# Patient Record
Sex: FEMALE | Race: WHITE | Employment: FULL TIME | URBAN - METROPOLITAN AREA
[De-identification: names, ages, dates, MRNs, and addresses within clinical notes are randomized per-mention and may not be internally consistent; named-entity substitution may affect disease eponyms.]

---

## 2021-06-03 VITALS — WEIGHT: 270 LBS | BODY MASS INDEX: 44.98 KG/M2 | HEIGHT: 65 IN

## 2021-06-03 RX ORDER — CITALOPRAM 20 MG/1
20 TABLET, FILM COATED ORAL EVERY EVENING
COMMUNITY

## 2021-06-03 RX ORDER — METFORMIN HYDROCHLORIDE 500 MG/1
500 TABLET ORAL EVERY EVENING
COMMUNITY

## 2021-06-03 NOTE — PERIOP NOTES
Patient verified name and . Order for consent not found in EHR ; patient verifies procedure. Type 2 surgery, Phone assessment complete. Orders not received. Labs per surgeon: none  Labs per anesthesia protocol: hgb and ekg - pt to come to [facility] between 0900 - 1000. Patient fully vaccinated for Covid -19 and card scanned to chart. Patient answered medical/surgical history questions at their best of ability. All prior to admission medications documented in [facility]. Patient instructed to take the following medications the day of surgery according to anesthesia guidelines with a small sip of water: none On the day before surgery please take Acetaminophen 1000mg in the morning and then again before bed. You may substitute for Tylenol 650 mg. Hold all vitamins 7 days prior to surgery and NSAIDS 5 days prior to surgery. Prescription meds to hold:none        Patient instructed on the following:    > Arrive at [address], time of arrival to be called the day before by 1700  > NPO after midnight including gum, mints, and ice chips  > Responsible adult must drive patient to the hospital, stay during surgery, and patient will need supervision 24 hours after anesthesia  > Use antibacterial soap in shower the night before surgery and on the morning of surgery  > All piercings must be removed prior to arrival.    > Leave all valuables (money and jewelry) at home but bring insurance card and ID on DOS.   > Do not wear make-up, nail polish, lotions, cologne, perfumes, powders, or oil on skin.

## 2021-06-04 ENCOUNTER — HOSPITAL ENCOUNTER (OUTPATIENT)
Dept: SURGERY | Age: 26
Discharge: HOME | End: 2021-06-04
Attending: OBSTETRICS & GYNECOLOGY
Payer: COMMERCIAL

## 2021-06-04 LAB
ATRIAL RATE: 103 BPM
CALCULATED P AXIS, ECG09: 28 DEGREES
CALCULATED R AXIS, ECG10: 28 DEGREES
CALCULATED T AXIS, ECG11: 4 DEGREES
DIAGNOSIS, 93000: NORMAL
HGB BLD-MCNC: 13.9 G/DL (ref 11.7–15.4)
P-R INTERVAL, ECG05: 152 MS
Q-T INTERVAL, ECG07: 348 MS
QRS DURATION, ECG06: 72 MS
QTC CALCULATION (BEZET), ECG08: 455 MS
VENTRICULAR RATE, ECG03: 103 BPM

## 2021-06-04 PROCEDURE — 93005 ELECTROCARDIOGRAM TRACING: CPT | Performed by: ANESTHESIOLOGY

## 2021-06-04 PROCEDURE — 36415 COLL VENOUS BLD VENIPUNCTURE: CPT

## 2021-06-04 PROCEDURE — 85018 HEMOGLOBIN: CPT

## 2021-06-04 NOTE — PERIOP NOTES
EKG done today- result needs anesthesia review. Pt reports has never had EKG done in past.    Hgb result pending. PAT charge RN to follow-up with anesthesia response.

## 2021-06-04 NOTE — PERIOP NOTES
Hgb result within anesthesia guidelines.     Recent Results (from the past 12 hour(s))   HEMOGLOBIN    Collection Time: 06/04/21 10:04 AM   Result Value Ref Range    HGB 13.9 11.7 - 15.4 g/dL

## 2021-06-04 NOTE — PERIOP NOTES
Dr Henry Lopez (anesthesia) reviewed pt's EKG with hx and stated \"okay\" to proceed with surgery- no further action required.

## 2021-06-08 ENCOUNTER — HOSPITAL ENCOUNTER (OUTPATIENT)
Dept: SURGERY | Age: 26
Discharge: HOME | End: 2021-06-08

## 2021-06-08 PROBLEM — N93.9 ABNORMAL UTERINE BLEEDING (AUB): Status: ACTIVE | Noted: 2021-06-08

## 2021-06-08 PROBLEM — D25.2 SUBSEROUS LEIOMYOMA OF UTERUS: Status: ACTIVE | Noted: 2021-06-08

## 2021-06-08 PROBLEM — R10.2 PELVIC PAIN: Status: ACTIVE | Noted: 2021-06-08

## 2021-06-08 PROBLEM — N94.6 DYSMENORRHEA: Status: ACTIVE | Noted: 2021-06-08

## 2021-06-08 NOTE — H&P
[facility]  Patient: David Rangel (Female)  [address] [facility]  [address] [place], [address] [facility] [address]  [phone]  [email or web address] :1995 (26)   Previous First Name:   Previous Last Name:   Don Choi   Ethnicity:Patient Declined  Sexual Orientation:   Gender Identity:   Encounter ID: [id number]   Primary Ins: [address] [employer] ([address] [place] [address])   Location: [address] ([facility])  [address] [place] [address]  [id number] [phone] Ext:[phone] Provider: Jonatan Quinones MD Referring:       Subjective  Chief Complaint:     Surgery (gyn) : Decision for Surgery/ Preop  Question  Comments  Planned Procedure: Hysteroscopy, Diagnostic Laparoscopy, Myomectomy, possible excision or laser ablation of endometriosis, Chromotubation. and any other indicated procedure. Medication History:  Date Medication Sig # Refill Status  2021 Farxiga 10 mg tablet   0 Active  2021 metformin 500 mg tablet   0 Active  2021 citalopram 20 mg tablet 1.5 tablet by mouth daily  0 Active  Allergies: Allergen Severity Adverse Reactions Onset Date Type Source Status  Azelastine Mild   Allergy Patient Active  Medical History:  Positive History  Condition Physician Hospitalization [address]   Asthma  Obstetrics History:  Pregnancy Hx  Total Preg. Full Term Premature Ab. Induced Ab. Spontaneous Ectopics Multiple Births Living  0         Surgical History:  Positive History  Condition Physician Hospitalization Hosp Date Hospital   knee arthroscopy- 10 yrs ago  wisdom teeth  frenectomy/periodontal  Family History:  Type Sex Status Age Name Negative History Positive History Onset Date Positive Comment Negative Comment  Mother F Alive         Maternal Grandmother F Alive    Breast Ca    Review of Systems:     Constitutional  Denied: Chills. Decline in health. Fatigue. Fever. Weakness. Weight Gain.  Weight Loss.   Head  Denied: Dizziness. Fainting. Head injury. Headaches. Pain. Sweats. Respiratory  Denied: Bronchitis. Cough. Pain. Pleurisy. Short of Breath. Wheezing. Cardiovascular  Denied: Chest Pain. Heart Murmur. History of Heart Attack. Palpitations. Short of Breath - Exertion. Short of Breath - Lying Flat. Short of Breath - Sleeping. Swelling of legs. Ulcers on Legs. Gastrointestinal  Denied: Abdominal pain. Black tarry stools. Change in stool color. Decreased Appetite. Nausea. Diarrhea. Rectal Bleeding. Vomiting. Heartburn. Constipation. Musculoskeletal  Denied: Arthritis. Muscle cramps. Restricted motion. Weakness. Joint Pain. Back Problems. Psychiatric  Denied: Memory loss. Depression. Excessive stress. Mood changes. Nervousness. Breasts  Denied: Discharge. Lumps. Tenderness. Pain. Endocrine  Denied: Cold Intolerance. Weakness. Heat Intolerance. Sweats. Weight Gain. Fatigue. Weight Loss. Thyroid Trouble. Genitourinary  Urinary  Denied: Burning. Urine odor. Frequency. Pain on Urination. Urgency. Incontinence. Retention. Urine discoloration. Blood in urine. Female Genitalia  Denied: Sexual Problems. Discharge. Itching. Menstrual Pain. Pain on intercourse. Objective  Vital Signs:     Blood Pressure  Artery Body Side Position Pressure Flag  Brachial Left Sitting 136/70 Normal  Height, Weight, BMI and Measurements  Height Weight BMI Flag Head Neck Waist  5' 5\" 273 (lb)  45.4 Obese          Physical Exam:       Constitutional: The patient is awake, alert, well developed, well nourished and well groomed. Head: The skull is normocephalic, atraumatic and without masses. The patient's facial expression and facial contours are normal; the parotid glands are not enlarged. There is no facial droop. Eyes: The eyelids are without lesions. The sclera is white and the conjunctiva pink. Pupils are equally round. ENT: External inspection of ears and nose is without scars, lesions or masses. Dentition is good. The lips are normal color; there are no ulcers, masses or lesions. Neck: The neck is supple and the trachea is midline. The thyroid is not enlarged and there are no palpable nodules. Respiratory: The patient is relaxed and breathes without effort. The patient is not cyanotic and does not use the accessory muscles of respiration. The lungs are clear to percussion. There are no crackles, wheezes, rhonchi, stridor or pleural rubs. Cardiovascular: The rate is normal, the rhythm is regular, S1 and S2 are normal, there are no murmurs, no gallops, and there are no rubs. The peripheral artery pulses are 2+ brisk. Lymphatics: There is no cervical, axillary, or inguinal adenopathy. Musculoskeletal: Upon inspection, the alignment of the major joints and spine is symmetrical. There are no ecchymosis, erythema, lacerations, subcutaneous nodules, or signs of muscle atrophy. Neurologic: Cranial Nerves 2-12 are grossly intact. The gait is normal.      Psychiatric: The patient is oriented to person, place, and time. Speech is fluent and words are clear. Thought processes are coherent, insight is good. The patient's fund of knowledge: awareness of current events and past history is appropriate for age. The patient's mood is neutral and the affect appropriate. Assessment  Diagnosis:          1) Pelvic pain, mostly LLQ, says over the ovaries,  reviewed prior information,  u/s and testing         ENLARGED UTERUS APPROX 8 WEEKS SIZE WITH LARGE POSTERIOR FUNDAL FIBROID SUBSEROSAL FIBROID MEASURES 6.2 X 5.3 X 5.6 CM ENDOMETRIUM IS HARD TO DELINEATE BORDERS - LIKELY MEASURES 10 MMS WITH SLIGHT COLOR SEEN IN ONE AREA. FIBROID DOES NOT DISTORT THE ENDOMETRIUM RIGHT OVARY IS VAGUELY SEEN - LIKELY WNL LEFT OVARY IS WNL VERY DIFFICULT SCAN BOTH TV AND ABDOMINALLY DUE TO BMI NO FREE FLUID SEEN.         Hysteroscopy, Diagnostic Laparoscopy, Myomectomy, possible excision or laser ablation of endometriosis, Chromotubation. and any other indicated procedure. wanted to examine but patient requested not to have one. Discussed the pain, the fibroids, possible causes,  the possible GI testing,  Open or Laparoscopic approach. With the bleeding issues and her obesity, would do a hysteroscopy at same time. told would try Laparoscopic since less risk for her. Risk for cancer, said by only one group to be 1/300, more likely data says 1/3,000. With her young age, feel less than 1/3,000. Plus would not spill as place in a bag to remove. Do aggressive irrigation. If was cancer, poor outcome either way. Risk of surgery greater due to her obesity, risk of infection, organ damage, new pain, chronic pain, bleeding, infection, anesthesia, life threatening events, pain remaining, and recovery. Fertility issues,  Will proceed with Hysteroscopy, Diagnostic Laparoscopy, Myomectomy, possible excision or laser ablation of endometriosis, Chromotubation. and any other indicated procedure. Description     Code Problem Comment    Leiomyoma Of Uterus, Unspecified D259    Other Obesity F822    Abnormal Uterine And Vaginal Bleeding, Unspecified N939    Pelvic And Perineal Pain R102    Secondary Dysmenorrhea N945      Plan    Procedure Coding: The risks, benefits, and alternatives of surgery were discussed with the patient, all questions answered, and consents signed. Risks of surgery are rare but sometimes unanticipated and include but are not limited to pain, bleeding, infection, reaction to anesthesia, blood clots, death. Risks also include damage to bowel, bladder, blood vessels, nerves, ureters, female reproductive tract and need for further surgery. Possible change in bladder, bowel, or sexual function. Patient understands all risks and would like to proceed with the surgery.      Description Code Units Modifiers Comments  Office O/p Est Hi [id number] 1 UN 57   Follow Up:  Type Recall Date Provider Name Notes  Follow-Up 3 Renetta Luke for the 2 week post op visit.

## 2021-06-11 ENCOUNTER — ANESTHESIA EVENT (OUTPATIENT)
Dept: SURGERY | Age: 26
End: 2021-06-11
Payer: COMMERCIAL

## 2021-06-14 ENCOUNTER — HOSPITAL ENCOUNTER (OUTPATIENT)
Age: 26
Setting detail: OUTPATIENT SURGERY
Discharge: HOME | End: 2021-06-14
Attending: OBSTETRICS & GYNECOLOGY | Admitting: OBSTETRICS & GYNECOLOGY
Payer: COMMERCIAL

## 2021-06-14 ENCOUNTER — ANESTHESIA (OUTPATIENT)
Dept: SURGERY | Age: 26
End: 2021-06-14
Payer: COMMERCIAL

## 2021-06-14 VITALS
BODY MASS INDEX: 45.1 KG/M2 | OXYGEN SATURATION: 93 % | TEMPERATURE: 99.1 F | DIASTOLIC BLOOD PRESSURE: 56 MMHG | RESPIRATION RATE: 18 BRPM | WEIGHT: 271 LBS | SYSTOLIC BLOOD PRESSURE: 145 MMHG | HEART RATE: 107 BPM

## 2021-06-14 DIAGNOSIS — G89.18 POST-OP PAIN: Primary | ICD-10-CM

## 2021-06-14 LAB
GLUCOSE BLD STRIP.AUTO-MCNC: 162 MG/DL (ref 65–100)
GLUCOSE BLD STRIP.AUTO-MCNC: 231 MG/DL (ref 65–100)
HCG UR QL: NEGATIVE
SERVICE CMNT-IMP: ABNORMAL
SERVICE CMNT-IMP: ABNORMAL

## 2021-06-14 PROCEDURE — 74011250637 HC RX REV CODE- 250/637: Performed by: ANESTHESIOLOGY

## 2021-06-14 PROCEDURE — 77030040830 HC CATH URETH FOL MDII -A: Performed by: OBSTETRICS & GYNECOLOGY

## 2021-06-14 PROCEDURE — 77030007955 HC PCH ENDOSC SPEC J&J -B: Performed by: OBSTETRICS & GYNECOLOGY

## 2021-06-14 PROCEDURE — 77030012317 HC CATH URET INT COVD -A: Performed by: OBSTETRICS & GYNECOLOGY

## 2021-06-14 PROCEDURE — 77030011502 HC MANIP UTER ZUM ZINN -B: Performed by: OBSTETRICS & GYNECOLOGY

## 2021-06-14 PROCEDURE — 2709999900 HC NON-CHARGEABLE SUPPLY: Performed by: OBSTETRICS & GYNECOLOGY

## 2021-06-14 PROCEDURE — 77030031139 HC SUT VCRL2 J&J -A: Performed by: OBSTETRICS & GYNECOLOGY

## 2021-06-14 PROCEDURE — 74011000250 HC RX REV CODE- 250

## 2021-06-14 PROCEDURE — 77030013626: Performed by: OBSTETRICS & GYNECOLOGY

## 2021-06-14 PROCEDURE — 77030028750 HC FCPS ENDOSC BPLR HALO OCOA -E: Performed by: OBSTETRICS & GYNECOLOGY

## 2021-06-14 PROCEDURE — 76010000135 HC OR TIME 4 TO 4.5 HR: Performed by: OBSTETRICS & GYNECOLOGY

## 2021-06-14 PROCEDURE — 77030003578 HC NDL INSUF VERES AMR -B: Performed by: OBSTETRICS & GYNECOLOGY

## 2021-06-14 PROCEDURE — 77030008771 HC TU NG SALEM SUMP -A: Performed by: NURSE ANESTHETIST, CERTIFIED REGISTERED

## 2021-06-14 PROCEDURE — 74011000250 HC RX REV CODE- 250: Performed by: OBSTETRICS & GYNECOLOGY

## 2021-06-14 PROCEDURE — 81025 URINE PREGNANCY TEST: CPT

## 2021-06-14 PROCEDURE — 77030008522 HC TBNG INSUF LAPRO STRY -B: Performed by: OBSTETRICS & GYNECOLOGY

## 2021-06-14 PROCEDURE — 74011250636 HC RX REV CODE- 250/636

## 2021-06-14 PROCEDURE — 77030022703 HC LIGASURE  BLNT LAPSCP SEAL COVD -E: Performed by: OBSTETRICS & GYNECOLOGY

## 2021-06-14 PROCEDURE — 77030003666 HC NDL SPINAL BD -A: Performed by: OBSTETRICS & GYNECOLOGY

## 2021-06-14 PROCEDURE — 74011000636 HC RX REV CODE- 636: Performed by: OBSTETRICS & GYNECOLOGY

## 2021-06-14 PROCEDURE — C1765 ADHESION BARRIER: HCPCS | Performed by: OBSTETRICS & GYNECOLOGY

## 2021-06-14 PROCEDURE — 77030034849: Performed by: OBSTETRICS & GYNECOLOGY

## 2021-06-14 PROCEDURE — 77030008606 HC TRCR ENDOSC KII AMR -B: Performed by: OBSTETRICS & GYNECOLOGY

## 2021-06-14 PROCEDURE — 77030040361 HC SLV COMPR DVT MDII -B: Performed by: OBSTETRICS & GYNECOLOGY

## 2021-06-14 PROCEDURE — 77030012770 HC TRCR OPT FX AMR -B: Performed by: OBSTETRICS & GYNECOLOGY

## 2021-06-14 PROCEDURE — 76210000020 HC REC RM PH II FIRST 0.5 HR: Performed by: OBSTETRICS & GYNECOLOGY

## 2021-06-14 PROCEDURE — 77030000038 HC TIP SCIS LAPSCP SURI -B: Performed by: OBSTETRICS & GYNECOLOGY

## 2021-06-14 PROCEDURE — 76210000016 HC OR PH I REC 1 TO 1.5 HR: Performed by: OBSTETRICS & GYNECOLOGY

## 2021-06-14 PROCEDURE — 77030040922 HC BLNKT HYPOTHRM STRY -A: Performed by: NURSE ANESTHETIST, CERTIFIED REGISTERED

## 2021-06-14 PROCEDURE — 74011250636 HC RX REV CODE- 250/636: Performed by: OBSTETRICS & GYNECOLOGY

## 2021-06-14 PROCEDURE — 77030008756 HC TU IRR SUC STRY -B: Performed by: OBSTETRICS & GYNECOLOGY

## 2021-06-14 PROCEDURE — 77030037088 HC TUBE ENDOTRACH ORAL NSL COVD-A: Performed by: NURSE ANESTHETIST, CERTIFIED REGISTERED

## 2021-06-14 PROCEDURE — 82962 GLUCOSE BLOOD TEST: CPT

## 2021-06-14 PROCEDURE — 77030039425 HC BLD LARYNG TRULITE DISP TELE -A: Performed by: NURSE ANESTHETIST, CERTIFIED REGISTERED

## 2021-06-14 PROCEDURE — 77030002933 HC SUT MCRYL J&J -A: Performed by: OBSTETRICS & GYNECOLOGY

## 2021-06-14 PROCEDURE — 77030019908 HC STETH ESOPH SIMS -A: Performed by: NURSE ANESTHETIST, CERTIFIED REGISTERED

## 2021-06-14 PROCEDURE — 88305 TISSUE EXAM BY PATHOLOGIST: CPT

## 2021-06-14 PROCEDURE — 77030020829: Performed by: OBSTETRICS & GYNECOLOGY

## 2021-06-14 PROCEDURE — 76060000039 HC ANESTHESIA 4 TO 4.5 HR: Performed by: OBSTETRICS & GYNECOLOGY

## 2021-06-14 PROCEDURE — 74011250636 HC RX REV CODE- 250/636: Performed by: ANESTHESIOLOGY

## 2021-06-14 PROCEDURE — 77030017963 HC PRB COAG1 CNMD -C: Performed by: OBSTETRICS & GYNECOLOGY

## 2021-06-14 PROCEDURE — 77030002966 HC SUT PDS J&J -A: Performed by: OBSTETRICS & GYNECOLOGY

## 2021-06-14 RX ORDER — ACETAMINOPHEN 500 MG
1000 TABLET ORAL ONCE
Status: COMPLETED | OUTPATIENT
Start: 2021-06-14 | End: 2021-06-14

## 2021-06-14 RX ORDER — OXYCODONE AND ACETAMINOPHEN 10; 325 MG/1; MG/1
1 TABLET ORAL AS NEEDED
Status: DISCONTINUED | OUTPATIENT
Start: 2021-06-14 | End: 2021-06-15 | Stop reason: HOSPADM

## 2021-06-14 RX ORDER — SODIUM CHLORIDE 0.9 % (FLUSH) 0.9 %
5-40 SYRINGE (ML) INJECTION AS NEEDED
Status: DISCONTINUED | OUTPATIENT
Start: 2021-06-14 | End: 2021-06-15 | Stop reason: HOSPADM

## 2021-06-14 RX ORDER — LIDOCAINE HYDROCHLORIDE 20 MG/ML
INJECTION, SOLUTION EPIDURAL; INFILTRATION; INTRACAUDAL; PERINEURAL AS NEEDED
Status: DISCONTINUED | OUTPATIENT
Start: 2021-06-14 | End: 2021-06-14 | Stop reason: HOSPADM

## 2021-06-14 RX ORDER — OXYCODONE HYDROCHLORIDE 5 MG/1
5 TABLET ORAL
Qty: 20 TABLET | Refills: 0 | Status: SHIPPED | OUTPATIENT
Start: 2021-06-14 | End: 2021-06-19

## 2021-06-14 RX ORDER — PROMETHAZINE HYDROCHLORIDE 12.5 MG/1
12.5-25 TABLET ORAL
Qty: 10 TABLET | Refills: 1 | Status: SHIPPED | OUTPATIENT
Start: 2021-06-14 | End: 2021-06-21

## 2021-06-14 RX ORDER — MIDAZOLAM HYDROCHLORIDE 1 MG/ML
2 INJECTION, SOLUTION INTRAMUSCULAR; INTRAVENOUS
Status: COMPLETED | OUTPATIENT
Start: 2021-06-14 | End: 2021-06-14

## 2021-06-14 RX ORDER — PROPOFOL 10 MG/ML
INJECTION, EMULSION INTRAVENOUS AS NEEDED
Status: DISCONTINUED | OUTPATIENT
Start: 2021-06-14 | End: 2021-06-14 | Stop reason: HOSPADM

## 2021-06-14 RX ORDER — DEXAMETHASONE SODIUM PHOSPHATE 4 MG/ML
INJECTION, SOLUTION INTRA-ARTICULAR; INTRALESIONAL; INTRAMUSCULAR; INTRAVENOUS; SOFT TISSUE AS NEEDED
Status: DISCONTINUED | OUTPATIENT
Start: 2021-06-14 | End: 2021-06-14 | Stop reason: HOSPADM

## 2021-06-14 RX ORDER — METHYLENE BLUE 10 MG/ML
INJECTION INTRAVENOUS AS NEEDED
Status: DISCONTINUED | OUTPATIENT
Start: 2021-06-14 | End: 2021-06-14 | Stop reason: HOSPADM

## 2021-06-14 RX ORDER — ONDANSETRON 2 MG/ML
INJECTION INTRAMUSCULAR; INTRAVENOUS AS NEEDED
Status: DISCONTINUED | OUTPATIENT
Start: 2021-06-14 | End: 2021-06-14 | Stop reason: HOSPADM

## 2021-06-14 RX ORDER — FLUCONAZOLE 150 MG/1
150 TABLET ORAL DAILY
Qty: 2 TABLET | Refills: 0 | Status: SHIPPED | OUTPATIENT
Start: 2021-06-14 | End: 2021-06-15

## 2021-06-14 RX ORDER — ROCURONIUM BROMIDE 10 MG/ML
INJECTION, SOLUTION INTRAVENOUS AS NEEDED
Status: DISCONTINUED | OUTPATIENT
Start: 2021-06-14 | End: 2021-06-14 | Stop reason: HOSPADM

## 2021-06-14 RX ORDER — MIDAZOLAM HYDROCHLORIDE 1 MG/ML
INJECTION, SOLUTION INTRAMUSCULAR; INTRAVENOUS AS NEEDED
Status: DISCONTINUED | OUTPATIENT
Start: 2021-06-14 | End: 2021-06-14 | Stop reason: HOSPADM

## 2021-06-14 RX ORDER — BUPIVACAINE HYDROCHLORIDE 5 MG/ML
INJECTION, SOLUTION EPIDURAL; INTRACAUDAL AS NEEDED
Status: DISCONTINUED | OUTPATIENT
Start: 2021-06-14 | End: 2021-06-14 | Stop reason: HOSPADM

## 2021-06-14 RX ORDER — FENTANYL CITRATE 50 UG/ML
INJECTION, SOLUTION INTRAMUSCULAR; INTRAVENOUS AS NEEDED
Status: DISCONTINUED | OUTPATIENT
Start: 2021-06-14 | End: 2021-06-14 | Stop reason: HOSPADM

## 2021-06-14 RX ORDER — OXYCODONE HYDROCHLORIDE 5 MG/1
5 TABLET ORAL
Status: COMPLETED | OUTPATIENT
Start: 2021-06-14 | End: 2021-06-14

## 2021-06-14 RX ORDER — KETOROLAC TROMETHAMINE 10 MG/1
10 TABLET, FILM COATED ORAL EVERY 6 HOURS
Qty: 15 TABLET | Refills: 0 | Status: SHIPPED | OUTPATIENT
Start: 2021-06-14 | End: 2021-06-18

## 2021-06-14 RX ORDER — ESMOLOL HYDROCHLORIDE 10 MG/ML
INJECTION INTRAVENOUS AS NEEDED
Status: DISCONTINUED | OUTPATIENT
Start: 2021-06-14 | End: 2021-06-14 | Stop reason: HOSPADM

## 2021-06-14 RX ORDER — HYDROMORPHONE HYDROCHLORIDE 2 MG/ML
0.5 INJECTION, SOLUTION INTRAMUSCULAR; INTRAVENOUS; SUBCUTANEOUS
Status: DISCONTINUED | OUTPATIENT
Start: 2021-06-14 | End: 2021-06-15 | Stop reason: HOSPADM

## 2021-06-14 RX ORDER — MIDAZOLAM HYDROCHLORIDE 1 MG/ML
INJECTION, SOLUTION INTRAMUSCULAR; INTRAVENOUS AS NEEDED
Status: DISCONTINUED | OUTPATIENT
Start: 2021-06-14 | End: 2021-06-14

## 2021-06-14 RX ORDER — SODIUM CHLORIDE 0.9 % (FLUSH) 0.9 %
5-40 SYRINGE (ML) INJECTION EVERY 8 HOURS
Status: DISCONTINUED | OUTPATIENT
Start: 2021-06-14 | End: 2021-06-15 | Stop reason: HOSPADM

## 2021-06-14 RX ORDER — VASOPRESSIN 20 U/ML
INJECTION PARENTERAL AS NEEDED
Status: DISCONTINUED | OUTPATIENT
Start: 2021-06-14 | End: 2021-06-14 | Stop reason: HOSPADM

## 2021-06-14 RX ORDER — SODIUM CHLORIDE, SODIUM LACTATE, POTASSIUM CHLORIDE, CALCIUM CHLORIDE 600; 310; 30; 20 MG/100ML; MG/100ML; MG/100ML; MG/100ML
75 INJECTION, SOLUTION INTRAVENOUS CONTINUOUS
Status: DISCONTINUED | OUTPATIENT
Start: 2021-06-14 | End: 2021-06-14 | Stop reason: HOSPADM

## 2021-06-14 RX ADMIN — FENTANYL CITRATE 50 MCG: 50 INJECTION INTRAMUSCULAR; INTRAVENOUS at 20:14

## 2021-06-14 RX ADMIN — PROPOFOL 200 MG: 10 INJECTION, EMULSION INTRAVENOUS at 16:52

## 2021-06-14 RX ADMIN — ROCURONIUM BROMIDE 20 MG: 10 INJECTION, SOLUTION INTRAVENOUS at 17:35

## 2021-06-14 RX ADMIN — ESMOLOL HYDROCHLORIDE 10 MG: 10 INJECTION, SOLUTION INTRAVENOUS at 20:40

## 2021-06-14 RX ADMIN — OXYCODONE 5 MG: 5 TABLET ORAL at 21:54

## 2021-06-14 RX ADMIN — DEXAMETHASONE SODIUM PHOSPHATE 8 MG: 4 INJECTION, SOLUTION INTRAMUSCULAR; INTRAVENOUS at 17:20

## 2021-06-14 RX ADMIN — LIDOCAINE HYDROCHLORIDE 100 MG: 20 INJECTION, SOLUTION EPIDURAL; INFILTRATION; INTRACAUDAL; PERINEURAL at 16:52

## 2021-06-14 RX ADMIN — ROCURONIUM BROMIDE 10 MG: 10 INJECTION, SOLUTION INTRAVENOUS at 18:52

## 2021-06-14 RX ADMIN — MIDAZOLAM 2 MG: 1 INJECTION INTRAMUSCULAR; INTRAVENOUS at 13:18

## 2021-06-14 RX ADMIN — FENTANYL CITRATE 50 MCG: 50 INJECTION INTRAMUSCULAR; INTRAVENOUS at 19:35

## 2021-06-14 RX ADMIN — ONDANSETRON 4 MG: 2 INJECTION INTRAMUSCULAR; INTRAVENOUS at 20:43

## 2021-06-14 RX ADMIN — ONDANSETRON 4 MG: 2 INJECTION INTRAMUSCULAR; INTRAVENOUS at 17:20

## 2021-06-14 RX ADMIN — HYDROMORPHONE HYDROCHLORIDE 0.5 MG: 2 INJECTION, SOLUTION INTRAMUSCULAR; INTRAVENOUS; SUBCUTANEOUS at 21:32

## 2021-06-14 RX ADMIN — FENTANYL CITRATE 100 MCG: 50 INJECTION INTRAMUSCULAR; INTRAVENOUS at 16:48

## 2021-06-14 RX ADMIN — ROCURONIUM BROMIDE 10 MG: 10 INJECTION, SOLUTION INTRAVENOUS at 19:33

## 2021-06-14 RX ADMIN — MIDAZOLAM 2 MG: 1 INJECTION INTRAMUSCULAR; INTRAVENOUS at 16:48

## 2021-06-14 RX ADMIN — ACETAMINOPHEN 1000 MG: 500 TABLET, FILM COATED ORAL at 11:00

## 2021-06-14 RX ADMIN — CEFAZOLIN 3 G: 1 INJECTION, POWDER, FOR SOLUTION INTRAVENOUS at 16:54

## 2021-06-14 RX ADMIN — SODIUM CHLORIDE, SODIUM LACTATE, POTASSIUM CHLORIDE, AND CALCIUM CHLORIDE 75 ML/HR: 600; 310; 30; 20 INJECTION, SOLUTION INTRAVENOUS at 11:00

## 2021-06-14 RX ADMIN — ROCURONIUM BROMIDE 10 MG: 10 INJECTION, SOLUTION INTRAVENOUS at 18:02

## 2021-06-14 RX ADMIN — FENTANYL CITRATE 100 MCG: 50 INJECTION INTRAMUSCULAR; INTRAVENOUS at 17:14

## 2021-06-14 RX ADMIN — ROCURONIUM BROMIDE 50 MG: 10 INJECTION, SOLUTION INTRAVENOUS at 16:52

## 2021-06-14 NOTE — ANESTHESIA PREPROCEDURE EVALUATION
Relevant Problems   No relevant active problems       Anesthetic History     PONV          Review of Systems / Medical History  Patient summary reviewed and pertinent labs reviewed    Pulmonary            Asthma : well controlled       Neuro/Psych         Psychiatric history    Comments: anxiety/depression Cardiovascular                  Exercise tolerance: >4 METS     GI/Hepatic/Renal               Comments: Elevated LFT's (6/1/21)--hepatic steatosis?  Endo/Other    Diabetes: poorly controlled, type 2, using insulin    Morbid obesity    Comments: HgbA1C-10.2 Other Findings            Physical Exam    Airway  Mallampati: II  TM Distance: > 6 cm  Neck ROM: normal range of motion   Mouth opening: Normal     Cardiovascular    Rhythm: regular           Dental      Comments: Retainer upper frontal incisors   Pulmonary                 Abdominal  GI exam deferred       Other Findings            Anesthetic Plan    ASA: 3  Anesthesia type: general          Induction: Intravenous  Anesthetic plan and risks discussed with: Patient

## 2021-06-15 RX ORDER — KETOROLAC TROMETHAMINE 10 MG/1
10 TABLET, FILM COATED ORAL EVERY 6 HOURS
Qty: 15 TABLET | Refills: 0 | Status: SHIPPED | OUTPATIENT
Start: 2021-06-15 | End: 2021-06-19

## 2021-06-15 RX ORDER — PROMETHAZINE HYDROCHLORIDE 12.5 MG/1
12.5-25 TABLET ORAL
Qty: 10 TABLET | Refills: 1 | Status: SHIPPED | OUTPATIENT
Start: 2021-06-15 | End: 2021-06-22

## 2021-06-15 RX ORDER — OXYCODONE HYDROCHLORIDE 5 MG/1
5 TABLET ORAL
Qty: 20 TABLET | Refills: 0 | Status: SHIPPED | OUTPATIENT
Start: 2021-06-15 | End: 2021-06-20

## 2021-06-15 NOTE — BRIEF OP NOTE
Brief Postoperative Note    Patient: Cuco Enriquez  YOB: 1995  MRN: [id number]    Date of Procedure: 6/14/2021     Pre-Op Diagnosis: Uterine leiomyoma, unspecified location [D25.9]  Pelvic pain in female [R10.2]  Vaginal hemorrhage [N93.9]    Post-Op Diagnosis: Same as preoperative diagnosis. and mild endometriosis      Procedure(s):  LAPAROSCOPY GYN DIAGNOSTIC, LASER ABLATION OF ENDOMETRIOSIS,   HYSTEROSCOPY  MYOMECTOMY LAPAROSCOPIC AND CHROMOTUBATION for fertility testing    Surgeon(s):  MD Kianna Anne MD    Surgical Assistant: None    Anesthesia: General     Estimated Blood Loss (mL): 391     Complications: None    Specimens:   ID Type Source Tests Collected by Time Destination   1 : fibroid Fresh Mass  Joe Swain MD 6/14/2021 2021 Pathology        Implants:   Implant Name Type Inv.  Item Serial No.  Lot No. LRB No. Used Action   BARRIER TISS ADH ABSRB 3X4IN Edgar [employer]  BARRIER TISS ADH ABSRB 3X4IN -- Amada Angelucci [id number] [facility] [employer]_ [id number] N/A 1 Implanted       Drains: * No LDAs found *    Findings: see dictation    Electronically Signed by Lyndsey Ledezma MD on 6/14/2021 at 8:49 PM

## 2021-06-15 NOTE — ANESTHESIA POSTPROCEDURE EVALUATION
Procedure(s):  LAPAROSCOPY GYN DIAGNOSTIC/ POSS LASER ABLATION OF ENDOMETRIOSIS  HYSTEROSCOPY  MYOMECTOMY LAPAROSCOPIC AND CHROMOTUBATION.     general    Anesthesia Post Evaluation        Patient location during evaluation: PACU  Patient participation: complete - patient participated  Level of consciousness: awake  Pain management: satisfactory to patient  Airway patency: patent  Anesthetic complications: no  Cardiovascular status: hemodynamically stable  Respiratory status: spontaneous ventilation  Hydration status: euvolemic  Post anesthesia nausea and vomiting:  none      INITIAL Post-op Vital signs:   Vitals Value Taken Time   /64 06/14/21 2158   Temp 37.3 °C (99.1 °F) 06/14/21 2053   Pulse 103 06/14/21 2158   Resp 16 06/14/21 2158   SpO2 95 % 06/14/21 2200

## 2021-06-15 NOTE — DISCHARGE INSTRUCTIONS
INSTRUCTIONS FOLLOWING GYN LAPAROSCOPY      ACTIVITY   Limit activity today; increase activity tomorrow, but no vigorous exercise   Shower only; no tub baths   No douches, tampons or intercourse until your doctor releases you (at least 2 weeks)   May return to work or school as directed by your doctor    DIET   Clear liquids until no nausea or vomiting   Advance to regular diet as tolerated    PAIN   Expect a moderate amount of pain.  Take pain medication as directed by your doctor. If no prescription for pain is sent home with you, take the appropriate dose of your commonly used pain medication.  Call you doctor if pain is NOT relieved by medication.  DO NOT take aspirin or blood thinners until directed by your doctor. DRESSING CARE *** Does Not Apply   Change dressing / band aids as directed by your doctor. FOLLOW PHONE [address] will be made by nursing staff.  If you have any problems or concerns, call your doctor as needed. CALL YOUR DOCTOR IF   Excessive bleeding that does not stop after holding mild pressure over the area for 15 minutes   You soak a pad in an hour   Temperature of 101°F or above   Green or yellow, smelly drainage or discharge   You are unable to urinate by bedtime   Nausea and vomiting that does not stop by bedtime    MEDICATION INTERACTION:  During your procedure you potentially received a medication or medications which may reduce the effectiveness of oral contraceptives. Please consider other forms of contraception for 1 month following your procedure if you are currently using oral contraceptives as your primary form of birth control.  In addition to this, we recommend continuing your oral contraceptive as prescribed, unless otherwise instructed by your physician, during this time    After general anesthesia or intravenous sedation, for 24 hours or while taking prescription Narcotics:  · Limit your activities  · A responsible adult needs to be with you for the next 24 hours  · Do not drive and operate hazardous machinery  · Do not make important personal or business decisions  · Do not drink alcoholic beverages  · If you have not urinated within 8 hours after discharge, and you are experiencing discomfort from urinary retention, please go to the nearest ED. · If you have sleep apnea and have a CPAP machine, please use it for all naps and sleeping. · Please use caution when taking narcotics and any of your home medications that may cause drowsiness. *  Please give a list of your current medications to your Primary Care Provider. *  Please update this list whenever your medications are discontinued, doses are      changed, or new medications (including over-the-counter products) are added. *  Please carry medication information at all times in case of emergency situations. These are general instructions for a healthy lifestyle:  No smoking/ No tobacco products/ Avoid exposure to second hand smoke  Surgeon General's Warning:  Quitting smoking now greatly reduces serious risk to your health. Obesity, smoking, and sedentary lifestyle greatly increases your risk for illness  A healthy diet, regular physical exercise & weight monitoring are important for maintaining a healthy lifestyle    You may be retaining fluid if you have a history of heart failure or if you experience any of the following symptoms:  Weight gain of 3 pounds or more overnight or 5 pounds in a week, increased swelling in our hands or feet or shortness of breath while lying flat in bed. Please call your doctor as soon as you notice any of these symptoms; do not wait until your next office visit.

## 2021-06-15 NOTE — PERIOP NOTES
Dr. Joaquin Gloria to bedside for post-op assessment- notified of patient's SQBS 231 mg/dL. No new orders received at this time.

## 2021-06-15 NOTE — OP NOTES
[address] [address]  OPERATIVE REPORT    Name:  Magaly Gorman  MR#:  [id number]  :  1995  ACCOUNT #:  [de-identified]  DATE OF SERVICE:  2021    PREOPERATIVE DIAGNOSES:  Large uterine fibroid, pelvic pain, dysmenorrhea with also menorrhagia. POSTOPERATIVE DIAGNOSES:  Large uterine fibroid, pelvic pain, dysmenorrhea with also menorrhagia with mild endometriosis. PROCEDURE PERFORMED:  Hysteroscopy with diagnostic laparoscopy with myomectomy laparoscopically, chromotubation for fertility testing, and laser ablation of endometriosis. SURGEON:  Leeanna Brooke MD    ASSISTANT:  Shannan Burks MD    ANESTHESIA:  General.    COMPLICATIONS:  None. SPECIMENS REMOVED:  Specimen sent to Pathology. IMPLANTS:  .    ESTIMATED BLOOD LOSS:  By Anesthesia 300 mL, but felt mostly irrigant, more likely less than 200. FINDINGS:  Greatly bulky uterus with a large fundal posterior fibroid on the right side. Chromotubation could not get spillage on either side, but with a hysteroscopy, the patient was in more of a luteal phase. There was a lot of thickened cavity or possibly even polycystic and excessive lining blocking the tubes. With chromotubation, had also intravascular spill occurrence. I felt like tubes very well could be opened but that the myomectomy was performed, did take meticulous work to do laparoscopically but got it out safely. There was also a left ovarian hemorrhagic cyst that a fair amount of bleeding did come from that compared not as much from the myomectomy. Also endometriosis was found with just some peritoneal windows that were laser ablated. There were no real darkened lesions or major vesicular implants. PROCEDURE:  After informed consent was obtained, the patient was taken to the OR and general anesthetic was administered. She was prepped and draped in the usual sterile fashion. Time-out was performed. A weighted speculum was placed in the vagina.   Cervix was grasped with a single-tooth tenaculum. After uterine sounding, which did sound to good 10+ cm, the hysteroscope was then placed up inside and noted no polyps, just a thickened overall cavity of the uterus and endometrial lining. Pictures were taken to document this. With this, then the HUMI cannula was placed up inside for uterine manipulation. Attention was then drawn above. All trocar sites were injected with Marcaine. Infraumbilical skin incision was made with a Veress needle. Her morbid obesity made it difficult to enter and had to get longer trocars used for bariatric surgery. Then, 5s were placed in the right and left quadrants along with the suprapubic 11. With the use of these manipulations, visualized above a fair amount of bleeding encountered from the ovary and due to her obesity with the trocars at last getting to being able to control this. Once we got the bariatric trocars in, we were then able to get the bipolar LigaSure and stopped the bleeding and excised the cyst.  Irrigation at this point noted good control. At this point with visualization of the peritoneum, chromotubation was performed with using 50 mL of Methyl Blue and could not get any extra expulsion through the tubes. With her obesity and problem with ventilation a concern, we went ahead and did not try this further, did try to manipulate and change the HUMI cannula and other things to see whether we could get the dye out, but as stated none was removed with Dr. Maryl Bosworth and myself both being required. We then made with monopolar cautery an incision on the fundal posterior aspect of the uterus, taking it around and through meticulous dissection, with both Dr. Maryl Bosworth at times cutting and sometimes myself and then dissected off. The fibroid was able to be successfully removed. Bipolar was used to control some of the bleeding in the intracavitary space. Then, some [id number] of some 0 Vicryl were used to reapproximate the tissues.   The uterine cavity was not entered as there was no distortion of the cavity noted on the hysteroscopy either. Once the cavity space was closed, then some figure-of-eight sutures were placed across the fundal portions and the serosal, and completely closed off the area of the myomectomy. Irrigation confirmed this. Interceed was used to put over the top to prevent some adhesions from sticking onto this area. The areas of endometriosis were then viewed and coagulated. Once areas of these peritoneal windows were coagulated, copious amounts of irrigation were then used. At this point, then the fibroid was placed inside of a bag and pulled up to the abdominal wall, was only the 11 trocar that had to be extended. In any event, this made it somewhat tedious and took some time. Dr. Rhett Foster and myself both. given different directions and cutting until the fibroid was able to be successfully removed with no spillage of the fibroid content. The incision at this point was then closed, with the fascia with a running 0 Vicryl and subcutaneous tissues were also reapproximated. The incision was only about less than 5 cm in size and was closed with subcuticular 4-0 Monocryl. Once again, visualized the pelvis, noted good hemostasis. No other signs of any bleeding, abnormality from the ovary or the myomectomy site and subsequently, the trocar was then taken out with direct visualization and all trocar sites were closed subcuticular with 4-0 Monocryl. All needle, instrument and sponge count were correct x2. The patient went back to recovery room in good condition.       Brigitte Castro MD      [id number]  D:  06/14/2021 23:06  T:  06/15/2021 9:14  JOB #:  [id number]  CC:  [id number] "[employer]"

## (undated) DEVICE — SUTURE VCRL SZ 0 L36IN ABSRB UD L36MM CT-1 1/2 CIR J946H

## (undated) DEVICE — GARMENT,MEDLINE,DVT,INT,CALF,LG, GEN2: Brand: MEDLINE

## (undated) DEVICE — 40585 XL ADVANCED TRENDELENBURG POSITIONING KIT: Brand: 40585 XL ADVANCED TRENDELENBURG POSITIONING KIT

## (undated) DEVICE — 2000CC GUARDIAN II: Brand: GUARDIAN

## (undated) DEVICE — BAG SPEC REM 224ML W4XL6IN DIA10MM 1 HND GYN DISP ENDOPCH

## (undated) DEVICE — SYR 10ML LUER LOK 1/5ML GRAD --

## (undated) DEVICE — KIT,ANTI FOG,W/SPONGE & FLUID,SOFT PACK: Brand: MEDLINE

## (undated) DEVICE — SUTURE MCRYL SZ 3-0 L27IN ABSRB UD L26MM SH 1/2 CIR Y416H

## (undated) DEVICE — CARDINAL HEALTH FLEXIBLE LIGHT HANDLE COVER: Brand: CARDINAL HEALTH

## (undated) DEVICE — SYRINGE CATH TIP 50ML

## (undated) DEVICE — NEEDLE SPNL 22GA L3.5IN BLK HUB S STL REG WALL FIT STYL W/

## (undated) DEVICE — SUTURE PDS II SZ 0 L27IN ABSRB VLT L36MM CT-1 1/2 CIR Z340H

## (undated) DEVICE — GOWN,REINF,POLY,ECL,PP SLV,XL: Brand: MEDLINE

## (undated) DEVICE — Device

## (undated) DEVICE — (D)PREP SKN CHLRAPRP APPL 26ML -- CONVERT TO ITEM 371833

## (undated) DEVICE — PREP SKN CHLRAPRP APL 26ML STR --

## (undated) DEVICE — 60 ML SYRINGE REGULAR TIP: Brand: MONOJECT

## (undated) DEVICE — Z DUPLICATE USE 2847003 INJECTOR UTER

## (undated) DEVICE — TRAY CATH 16F DRN BG LTX -- CONVERT TO ITEM 363158

## (undated) DEVICE — PVC URETHRAL CATHETER: Brand: DOVER

## (undated) DEVICE — FORCEPS ES L33CM DIA5MM CUT ERGO CUT BLDE TRIG HND ACT

## (undated) DEVICE — LOGICUT SCISSOR LENGTH 320MM: Brand: LOGI - LAPAROSCOPIC INSTRUMENT SYSTEM

## (undated) DEVICE — DRAPE,UNDERBUTTOCKS,PCH,STERILE: Brand: MEDLINE

## (undated) DEVICE — PAD MATERNITY 11IN W/TAILS -- STRL

## (undated) DEVICE — TROCAR: Brand: KII FIOS FIRST ENTRY

## (undated) DEVICE — TROCAR: Brand: KII® SLEEVE

## (undated) DEVICE — SOLUTION IV 1000ML 0.9% SOD CHL

## (undated) DEVICE — CONTAINER SPEC FRMLN 120ML --

## (undated) DEVICE — [HIGH FLOW INSUFFLATOR,  DO NOT USE IF PACKAGE IS DAMAGED,  KEEP DRY,  KEEP AWAY FROM SUNLIGHT,  PROTECT FROM HEAT AND RADIOACTIVE SOURCES.]: Brand: PNEUMOSURE

## (undated) DEVICE — GYN LAPAROSCOPY: Brand: MEDLINE INDUSTRIES, INC.

## (undated) DEVICE — ABC PROBE 5 MM FOOTSWITCHING PROBE: Brand: ABC

## (undated) DEVICE — SUTURE VCRL SZ 0 L27IN ABSRB UD L26MM CT-2 1/2 CIR J270H

## (undated) DEVICE — JELLY LUBRICATING 10GM PREFIL SYR LUBE

## (undated) DEVICE — DRAPE TWL SURG 16X26IN BLU ORB04] ALLCARE INC]

## (undated) DEVICE — FILTER SMK EVAC FLO CLR MEGADYNE

## (undated) DEVICE — 2, DISPOSABLE SUCTION/IRRIGATOR WITHOUT DISPOSABLE TIP: Brand: STRYKEFLOW

## (undated) DEVICE — REM POLYHESIVE ADULT PATIENT RETURN ELECTRODE: Brand: VALLEYLAB

## (undated) DEVICE — TOTAL 1-LAYER TRAY, LATEX FOLEY, 16FR 10: Brand: MEDLINE

## (undated) DEVICE — CYSTO: Brand: MEDLINE INDUSTRIES, INC.

## (undated) DEVICE — LAPAROSCOPIC TROCAR SLEEVE/SINGLE USE: Brand: KII® OPTICAL ACCESS SYSTEM

## (undated) DEVICE — TUBING, SUCTION, 1/4" X 10', STRAIGHT: Brand: MEDLINE

## (undated) DEVICE — ELECTRD RMFG BLNT TP LIGSR 5MM --

## (undated) DEVICE — INSUFFLATION NEEDLE TO ESTABLISH PNEUMOPERITONEUM.: Brand: INSUFFLATION NEEDLE

## (undated) DEVICE — SUTURE SZ 0 27IN 5/8 CIR UR-6  TAPER PT VIOLET ABSRB VICRYL J603H

## (undated) DEVICE — TRAY PREP DRY W/ PREM GLV 2 APPL 6 SPNG 2 UNDPD 1 OVERWRAP

## (undated) DEVICE — CONTAINER SPEC HISTOLOGY 900ML POLYPR

## (undated) DEVICE — SOLUTION IRRIG 3000ML 0.9% SOD CHL FLX CONT 0797208] ICU MEDICAL INC]